# Patient Record
Sex: MALE | Race: OTHER | NOT HISPANIC OR LATINO | URBAN - METROPOLITAN AREA
[De-identification: names, ages, dates, MRNs, and addresses within clinical notes are randomized per-mention and may not be internally consistent; named-entity substitution may affect disease eponyms.]

---

## 2024-08-01 ENCOUNTER — OFFICE VISIT (OUTPATIENT)
Dept: URGENT CARE | Facility: CLINIC | Age: 79
End: 2024-08-01
Payer: COMMERCIAL

## 2024-08-01 VITALS
WEIGHT: 205 LBS | RESPIRATION RATE: 20 BRPM | HEART RATE: 86 BPM | OXYGEN SATURATION: 98 % | DIASTOLIC BLOOD PRESSURE: 60 MMHG | SYSTOLIC BLOOD PRESSURE: 110 MMHG | TEMPERATURE: 98.3 F

## 2024-08-01 DIAGNOSIS — L03.031 CELLULITIS OF GREAT TOE OF RIGHT FOOT: ICD-10-CM

## 2024-08-01 DIAGNOSIS — B35.1 ONYCHOMYCOSIS: Primary | ICD-10-CM

## 2024-08-01 PROCEDURE — G2211 COMPLEX E/M VISIT ADD ON: HCPCS | Performed by: PHYSICIAN ASSISTANT

## 2024-08-01 PROCEDURE — 99213 OFFICE O/P EST LOW 20 MIN: CPT | Performed by: PHYSICIAN ASSISTANT

## 2024-08-01 RX ORDER — MONTELUKAST SODIUM 10 MG/1
TABLET ORAL
COMMUNITY
Start: 2024-06-02

## 2024-08-01 RX ORDER — LEVOTHYROXINE SODIUM 100 MCG
TABLET ORAL
COMMUNITY
Start: 2024-05-20

## 2024-08-01 RX ORDER — FOLIC ACID 1 MG/1
TABLET ORAL
COMMUNITY
Start: 2024-06-02

## 2024-08-01 RX ORDER — AZELASTINE 1 MG/ML
2 SPRAY, METERED NASAL 2 TIMES DAILY
COMMUNITY
Start: 2024-06-03

## 2024-08-01 RX ORDER — LISINOPRIL AND HYDROCHLOROTHIAZIDE 12.5; 1 MG/1; MG/1
1 TABLET ORAL DAILY
COMMUNITY
Start: 2024-06-03

## 2024-08-01 RX ORDER — ATORVASTATIN CALCIUM 40 MG/1
40 TABLET, FILM COATED ORAL DAILY
COMMUNITY
Start: 2024-06-03

## 2024-08-01 RX ORDER — DONEPEZIL HYDROCHLORIDE 10 MG/1
10 TABLET, FILM COATED ORAL DAILY
COMMUNITY
Start: 2024-06-03

## 2024-08-01 RX ORDER — CEPHALEXIN 500 MG/1
500 CAPSULE ORAL EVERY 8 HOURS SCHEDULED
Qty: 21 CAPSULE | Refills: 0 | Status: SHIPPED | OUTPATIENT
Start: 2024-08-01 | End: 2024-08-08

## 2024-08-01 RX ORDER — MEMANTINE HYDROCHLORIDE 10 MG/1
10 TABLET ORAL 2 TIMES DAILY
COMMUNITY
Start: 2024-06-03

## 2024-08-01 RX ORDER — CICLOPIROX 80 MG/ML
SOLUTION TOPICAL
Qty: 6 ML | Refills: 0 | Status: SHIPPED | OUTPATIENT
Start: 2024-08-01

## 2024-08-01 RX ORDER — CLOTRIMAZOLE AND BETAMETHASONE DIPROPIONATE 10; .64 MG/G; MG/G
CREAM TOPICAL
COMMUNITY
Start: 2024-06-03

## 2024-08-01 RX ORDER — FINASTERIDE 5 MG/1
5 TABLET, FILM COATED ORAL DAILY
COMMUNITY
Start: 2024-06-02

## 2024-08-01 RX ORDER — TERAZOSIN 5 MG/1
5 CAPSULE ORAL DAILY
COMMUNITY
Start: 2024-06-03

## 2024-08-01 NOTE — PROGRESS NOTES
[facility]        NAME: Yeyo Beatty is a 79 y.o. male  : 1945    MRN: [id number]  DATE: 2024  TIME: 11:40 AM    Assessment and Plan   Onychomycosis [B35.1]  1. Onychomycosis  Ambulatory Referral to Podiatry    ciclopirox (PENLAC) 8 % solution      2. Cellulitis of great toe of right foot  cephalexin (KEFLEX) 500 mg capsule    mupirocin (BACTROBAN) 2 % ointment            Patient Instructions   Tinea Unguium:   -The patients hx and presentation are consistent with a dermatophyte infection of the nailbed and subsequent infection. Will prescribe ciclopirox 8% nail lacquer applied once daily to the affected nails and 5mm of the surrounding skin and nailbed. Wipe clean the nail bed with an alcohol pad once a week. Keep nails short. Keep nails clean and dry. You can soak your feet in warm water and white vinegar once daily for 4-6 weeks. Will send in Keflex 500mg PO TID to be taken with food and a probiotic. Topical Bactroban also prescribed. Will Follow up with Podiatry within the next 5 days. Red flag signs and ED precautions discussed in depth with his wife.     Follow up with PCP in 3-5 days.  Proceed to  ER if symptoms worsen.    If tests have been performed at [facility], our office will contact you with results if changes need to be made to the care plan discussed with you at the visit.  [facility]    Chief Complaint     Chief Complaint   Patient presents with    Recurrent Skin Infections     Pt  for concerns of a right foot infection   pt states  toe  pain and lifted,  pt has been using fungus med and now nail has become soft and now is infected. Issue has been on going x 2 weeks.          History of Present Illness       The patient is a 79-year-old male with a hx of HTN, HLD, dementia, hypothyroidism who presents today with his wife and Grand-daughter for concerns of R foot infection. The patient has onychomycosis that is the worst on the  R great toe, he has been applying clotrimazole cream onto the toe and the nail is now soft, lifted and tender. He has pain with weight bearing. He has full ROM of the R great toe. There is mild redness around the toenail. There is no oozing or drainage. No fever or chills. He denies injury or trauma to the nail.         Review of Systems   Review of Systems   Constitutional:  Negative for activity change, appetite change, chills, fatigue and fever.   Musculoskeletal:  Positive for arthralgias and gait problem. Negative for back pain, joint swelling, myalgias, neck pain and neck stiffness.   Skin:  Positive for color change. Negative for pallor, rash and wound.   Allergic/Immunologic: Negative for immunocompromised state.   Neurological:  Negative for dizziness, weakness, light-headedness and numbness.   Hematological:  Does not bruise/bleed easily.         Current Medications       Current Outpatient Medications:     atorvastatin (LIPITOR) 40 mg tablet, Take 40 mg by mouth daily, Disp: , Rfl:     azelastine (ASTELIN) 0.1 % nasal spray, 2 sprays 2 (two) times a day, Disp: , Rfl:     cephalexin (KEFLEX) 500 mg capsule, Take 1 capsule (500 mg total) by mouth every 8 (eight) hours for 7 days, Disp: 21 capsule, Rfl: 0    ciclopirox (PENLAC) 8 % solution, Apply topically daily at bedtime, Disp: 6 mL, Rfl: 0    clotrimazole-betamethasone (LOTRISONE) 1-0.05 % cream, APLICAR EN LA ENTREPIERNA DOS VECES AL ANGELITA, Disp: , Rfl:     donepezil (ARICEPT) 10 mg tablet, Take 10 mg by mouth daily, Disp: , Rfl:     finasteride (PROSCAR) 5 mg tablet, Take 5 mg by mouth daily, Disp: , Rfl:     folic acid (FOLVITE) 1 mg tablet, TOME 1 TABLETA DIARIAMENTE, Disp: , Rfl:     lisinopril-hydrochlorothiazide (PRINZIDE,ZESTORETIC) 10-12.5 MG per tablet, Take 1 tablet by mouth daily, Disp: , Rfl:     memantine (NAMENDA) 10 mg tablet, Take 10 mg by mouth 2 (two) times a day, Disp: , Rfl:     montelukast (SINGULAIR) 10 mg tablet, TAKE 1 TABLET BY  MOUTH EVERY DAY FOR ALLERGY, Disp: , Rfl:     mupirocin (BACTROBAN) 2 % ointment, Apply topically 3 (three) times a day, Disp: 30 g, Rfl: 0    Synthroid 100 MCG tablet, TOME TYSON TABLETA DIARIAMENTE CON ESTOMAGO VACIO, Disp: , Rfl:     terazosin (HYTRIN) 5 mg capsule, Take 5 mg by mouth daily, Disp: , Rfl:     Current Allergies     Allergies as of 08/01/2024    (No Known Allergies)            The following portions of the patient's history were reviewed and updated as appropriate: allergies, current medications, past family history, past medical history, past social history, past surgical history and problem list.     Past Medical History:   Diagnosis Date    Arthritis     High cholesterol     Hypertension     Memory loss     Skin cancer        Past Surgical History:   Procedure Laterality Date    ARM SKIN LESION BIOPSY / EXCISION      CARPAL TUNNEL RELEASE Left     CATARACT EXTRACTION         Family History   Problem Relation Age of Onset    Cancer Mother     Cancer Father          Medications have been verified.        Objective   /60   Pulse 86   Temp 98.3 °F (36.8 °C)   Resp 20   Wt 93 kg (205 lb)   SpO2 98%   No LMP for male patient.       Physical Exam     Physical Exam  Vitals and nursing note reviewed.   Constitutional:       General: He is not in acute distress.     Appearance: He is well-developed. He is not ill-appearing, toxic-appearing or diaphoretic.   Cardiovascular:      Rate and Rhythm: Normal rate and regular rhythm.      Heart sounds: Normal heart sounds, S1 normal and S2 normal.   Pulmonary:      Effort: Pulmonary effort is normal.      Breath sounds: Normal breath sounds and air entry.   Musculoskeletal:      Comments: Right Great Toe: There is significant onychomycosis of the nail which is lifted off of the nailbed, hypertrophic, yellow in color, flaking. He has tenderness of the distal phalanx. There is minor surrounding erythema and edema around the nail bed over the lateral nail  folds. No extension of the erythema. He has full ROM of the toe. Gait is normal. There is no oozing or drainage from the toe.    Skin:     General: Skin is warm, dry and intact.      Capillary Refill: Capillary refill takes less than 2 seconds.      Findings: Erythema present. No bruising or ecchymosis.   Neurological:      General: No focal deficit present.      Mental Status: He is alert and oriented to person, place, and time.      Sensory: Sensation is intact. No sensory deficit.      Motor: Motor function is intact. No weakness or abnormal muscle tone.      Gait: Gait is intact. Gait normal.   Psychiatric:         Mood and Affect: Mood and affect normal.         Behavior: Behavior normal.

## 2024-08-01 NOTE — PATIENT INSTRUCTIONS
Tinea Unguium:   -The patients hx and presentation are consistent with a dermatophyte infection of the nailbed and subsequent infection. Will prescribe ciclopirox 8% nail lacquer applied once daily to the affected nails and 5mm of the surrounding skin and nailbed. Wipe clean the nail bed with an alcohol pad once a week. Keep nails short. Keep nails clean and dry. You can soak your feet in warm water and white vinegar once daily for 4-6 weeks. Will send in Keflex 500mg PO TID to be taken with food and a probiotic. Topical Bactroban also prescribed. Will Follow up with Podiatry within the next 5 days. Red flag signs and ED precautions discussed in depth with his wife.